# Patient Record
Sex: MALE | NOT HISPANIC OR LATINO | Employment: FULL TIME | URBAN - METROPOLITAN AREA
[De-identification: names, ages, dates, MRNs, and addresses within clinical notes are randomized per-mention and may not be internally consistent; named-entity substitution may affect disease eponyms.]

---

## 2024-12-24 ENCOUNTER — HOSPITAL ENCOUNTER (EMERGENCY)
Facility: HOSPITAL | Age: 54
Discharge: HOME | End: 2024-12-24
Attending: EMERGENCY MEDICINE
Payer: COMMERCIAL

## 2024-12-24 ENCOUNTER — APPOINTMENT (OUTPATIENT)
Dept: RADIOLOGY | Facility: HOSPITAL | Age: 54
End: 2024-12-24
Payer: COMMERCIAL

## 2024-12-24 VITALS
WEIGHT: 225 LBS | HEIGHT: 70 IN | OXYGEN SATURATION: 95 % | RESPIRATION RATE: 16 BRPM | SYSTOLIC BLOOD PRESSURE: 125 MMHG | TEMPERATURE: 97.3 F | BODY MASS INDEX: 32.21 KG/M2 | HEART RATE: 80 BPM | DIASTOLIC BLOOD PRESSURE: 50 MMHG

## 2024-12-24 DIAGNOSIS — R22.43 LOCALIZED SWELLING OF BOTH LOWER LEGS: Primary | ICD-10-CM

## 2024-12-24 DIAGNOSIS — M79.604 LEG PAIN, BILATERAL: ICD-10-CM

## 2024-12-24 DIAGNOSIS — Z96.653 HISTORY OF BILATERAL KNEE REPLACEMENT: ICD-10-CM

## 2024-12-24 DIAGNOSIS — M79.605 LEG PAIN, BILATERAL: ICD-10-CM

## 2024-12-24 LAB
ALBUMIN SERPL BCP-MCNC: 3.7 G/DL (ref 3.4–5)
ANION GAP SERPL CALC-SCNC: 12 MMOL/L (ref 10–20)
BASOPHILS # BLD AUTO: 0.05 X10*3/UL (ref 0–0.1)
BASOPHILS NFR BLD AUTO: 0.7 %
BUN SERPL-MCNC: 14 MG/DL (ref 6–23)
CALCIUM SERPL-MCNC: 9.2 MG/DL (ref 8.6–10.6)
CHLORIDE SERPL-SCNC: 97 MMOL/L (ref 98–107)
CO2 SERPL-SCNC: 29 MMOL/L (ref 21–32)
CREAT SERPL-MCNC: 0.98 MG/DL (ref 0.5–1.3)
CRP SERPL-MCNC: 8.21 MG/DL
EGFRCR SERPLBLD CKD-EPI 2021: >90 ML/MIN/1.73M*2
EOSINOPHIL # BLD AUTO: 0.07 X10*3/UL (ref 0–0.7)
EOSINOPHIL NFR BLD AUTO: 1 %
ERYTHROCYTE [DISTWIDTH] IN BLOOD BY AUTOMATED COUNT: 12.4 % (ref 11.5–14.5)
ERYTHROCYTE [SEDIMENTATION RATE] IN BLOOD BY WESTERGREN METHOD: 39 MM/H (ref 0–20)
GLUCOSE SERPL-MCNC: 132 MG/DL (ref 74–99)
HCT VFR BLD AUTO: 27.9 % (ref 41–52)
HGB BLD-MCNC: 9.9 G/DL (ref 13.5–17.5)
HOLD SPECIMEN: NORMAL
HOLD SPECIMEN: NORMAL
IMM GRANULOCYTES # BLD AUTO: 0.07 X10*3/UL (ref 0–0.7)
IMM GRANULOCYTES NFR BLD AUTO: 1 % (ref 0–0.9)
LYMPHOCYTES # BLD AUTO: 0.82 X10*3/UL (ref 1.2–4.8)
LYMPHOCYTES NFR BLD AUTO: 11.4 %
MAGNESIUM SERPL-MCNC: 1.88 MG/DL (ref 1.6–2.4)
MCH RBC QN AUTO: 29.9 PG (ref 26–34)
MCHC RBC AUTO-ENTMCNC: 35.5 G/DL (ref 32–36)
MCV RBC AUTO: 84 FL (ref 80–100)
MONOCYTES # BLD AUTO: 1.04 X10*3/UL (ref 0.1–1)
MONOCYTES NFR BLD AUTO: 14.4 %
NEUTROPHILS # BLD AUTO: 5.15 X10*3/UL (ref 1.2–7.7)
NEUTROPHILS NFR BLD AUTO: 71.5 %
NRBC BLD-RTO: 0 /100 WBCS (ref 0–0)
PHOSPHATE SERPL-MCNC: 3.1 MG/DL (ref 2.5–4.9)
PLATELET # BLD AUTO: 234 X10*3/UL (ref 150–450)
POTASSIUM SERPL-SCNC: 4.4 MMOL/L (ref 3.5–5.3)
RBC # BLD AUTO: 3.31 X10*6/UL (ref 4.5–5.9)
SODIUM SERPL-SCNC: 134 MMOL/L (ref 136–145)
WBC # BLD AUTO: 7.2 X10*3/UL (ref 4.4–11.3)

## 2024-12-24 PROCEDURE — 2500000001 HC RX 250 WO HCPCS SELF ADMINISTERED DRUGS (ALT 637 FOR MEDICARE OP): Performed by: EMERGENCY MEDICINE

## 2024-12-24 PROCEDURE — 86140 C-REACTIVE PROTEIN: CPT

## 2024-12-24 PROCEDURE — 2500000001 HC RX 250 WO HCPCS SELF ADMINISTERED DRUGS (ALT 637 FOR MEDICARE OP)

## 2024-12-24 PROCEDURE — 85652 RBC SED RATE AUTOMATED: CPT

## 2024-12-24 PROCEDURE — 99284 EMERGENCY DEPT VISIT MOD MDM: CPT | Mod: 25 | Performed by: EMERGENCY MEDICINE

## 2024-12-24 PROCEDURE — 73560 X-RAY EXAM OF KNEE 1 OR 2: CPT | Mod: BILATERAL PROCEDURE | Performed by: RADIOLOGY

## 2024-12-24 PROCEDURE — 73560 X-RAY EXAM OF KNEE 1 OR 2: CPT | Mod: 50

## 2024-12-24 PROCEDURE — 83735 ASSAY OF MAGNESIUM: CPT

## 2024-12-24 PROCEDURE — 80069 RENAL FUNCTION PANEL: CPT

## 2024-12-24 PROCEDURE — 93970 EXTREMITY STUDY: CPT

## 2024-12-24 PROCEDURE — 99285 EMERGENCY DEPT VISIT HI MDM: CPT | Performed by: EMERGENCY MEDICINE

## 2024-12-24 PROCEDURE — 36415 COLL VENOUS BLD VENIPUNCTURE: CPT

## 2024-12-24 PROCEDURE — 85025 COMPLETE CBC W/AUTO DIFF WBC: CPT

## 2024-12-24 PROCEDURE — 93971 EXTREMITY STUDY: CPT | Performed by: RADIOLOGY

## 2024-12-24 PROCEDURE — 99285 EMERGENCY DEPT VISIT HI MDM: CPT | Mod: 25

## 2024-12-24 RX ORDER — OXYCODONE AND ACETAMINOPHEN 5; 325 MG/1; MG/1
1 TABLET ORAL ONCE
Status: COMPLETED | OUTPATIENT
Start: 2024-12-24 | End: 2024-12-24

## 2024-12-24 RX ORDER — ACETAMINOPHEN 325 MG/1
650 TABLET ORAL ONCE
Status: COMPLETED | OUTPATIENT
Start: 2024-12-24 | End: 2024-12-24

## 2024-12-24 RX ORDER — OXYCODONE HYDROCHLORIDE 5 MG/1
5 TABLET ORAL ONCE
Status: COMPLETED | OUTPATIENT
Start: 2024-12-24 | End: 2024-12-24

## 2024-12-24 RX ORDER — OXYCODONE HYDROCHLORIDE 5 MG/1
5 TABLET ORAL EVERY 4 HOURS PRN
Qty: 12 TABLET | Refills: 0 | Status: SHIPPED | OUTPATIENT
Start: 2024-12-24 | End: 2025-01-13

## 2024-12-24 RX ORDER — POLYETHYLENE GLYCOL 3350 17 G/17G
17 POWDER, FOR SOLUTION ORAL DAILY
Qty: 20 PACKET | Refills: 0 | Status: SHIPPED | OUTPATIENT
Start: 2024-12-24 | End: 2025-01-13

## 2024-12-24 RX ORDER — NALOXONE HYDROCHLORIDE 1 MG/ML
2 INJECTION INTRAMUSCULAR; INTRAVENOUS; SUBCUTANEOUS AS NEEDED
Qty: 2 ML | Refills: 0 | Status: SHIPPED | OUTPATIENT
Start: 2024-12-24

## 2024-12-24 RX ADMIN — OXYCODONE HYDROCHLORIDE AND ACETAMINOPHEN 1 TABLET: 5; 325 TABLET ORAL at 16:04

## 2024-12-24 RX ADMIN — ACETAMINOPHEN 650 MG: 325 TABLET ORAL at 17:13

## 2024-12-24 RX ADMIN — OXYCODONE HYDROCHLORIDE 5 MG: 5 TABLET ORAL at 17:13

## 2024-12-24 ASSESSMENT — LIFESTYLE VARIABLES
HAVE YOU EVER FELT YOU SHOULD CUT DOWN ON YOUR DRINKING: NO
HAVE PEOPLE ANNOYED YOU BY CRITICIZING YOUR DRINKING: NO
TOTAL SCORE: 0
EVER FELT BAD OR GUILTY ABOUT YOUR DRINKING: NO
EVER HAD A DRINK FIRST THING IN THE MORNING TO STEADY YOUR NERVES TO GET RID OF A HANGOVER: NO

## 2024-12-24 ASSESSMENT — PAIN - FUNCTIONAL ASSESSMENT: PAIN_FUNCTIONAL_ASSESSMENT: 0-10

## 2024-12-24 ASSESSMENT — COLUMBIA-SUICIDE SEVERITY RATING SCALE - C-SSRS
6. HAVE YOU EVER DONE ANYTHING, STARTED TO DO ANYTHING, OR PREPARED TO DO ANYTHING TO END YOUR LIFE?: NO
1. IN THE PAST MONTH, HAVE YOU WISHED YOU WERE DEAD OR WISHED YOU COULD GO TO SLEEP AND NOT WAKE UP?: NO
2. HAVE YOU ACTUALLY HAD ANY THOUGHTS OF KILLING YOURSELF?: NO

## 2024-12-24 ASSESSMENT — PAIN DESCRIPTION - DESCRIPTORS: DESCRIPTORS: SHARP;PRESSURE

## 2024-12-24 ASSESSMENT — PAIN DESCRIPTION - LOCATION
LOCATION: KNEE
LOCATION: KNEE

## 2024-12-24 ASSESSMENT — PAIN SCALES - GENERAL
PAINLEVEL_OUTOF10: 10 - WORST POSSIBLE PAIN
PAINLEVEL_OUTOF10: 10 - WORST POSSIBLE PAIN

## 2024-12-24 ASSESSMENT — PAIN DESCRIPTION - ORIENTATION: ORIENTATION: LEFT

## 2024-12-24 ASSESSMENT — PAIN DESCRIPTION - PAIN TYPE: TYPE: ACUTE PAIN

## 2024-12-24 NOTE — PROGRESS NOTES
I received this patient in signout, he is presenting with bilateral lower leg pain after very recent bilateral knee replacement.  White count is reassuring, DVT ultrasound is negative for DVTs.  I reevaluated the patient, he does have some edema to the lower extremities, but otherwise appears well-perfused, his legs do not appear erythematous or hot to the touch, I have low suspicion for cellulitis or infection.  ESR and CRP are slightly elevated, likely from the fact that he recently had surgery, does not have a fever and white count is reassuring.  Did note that the ultrasound could not fully visualize one of the veins in the left leg, did recommend a repeat ultrasound in 1 to 2 weeks.  When I spoke to him, one of his main concerns was pain control at home, had gotten 5 mg of oxycodone which was helping but still not taking his pain to a tolerable level.  I increased his oxycodone dose for home as well as recommended Tylenol around-the-clock for pain control.  Given his surgery was at an outside facility, and workup in the ER thus far is negative with reassuring physical exam, I do not feel like an orthopedic consult is indicated.  Discharged in stable condition with good return precautions.

## 2024-12-24 NOTE — ED TRIAGE NOTES
Pt states that he had a bilateral knee replacement Thursday and pt states that left knee is red, swollen and increased pain,

## 2024-12-24 NOTE — ED PROVIDER NOTES
Emergency Department Provider Note        History of Present Illness     CC: Wound Check and Constipation     HPI:  This is a 54-year-old male with recent bilateral knee replacements who presents to the emergency department with left leg pain.  Patient's wife is at bedside and provides additional history.  He states that he had his knees replaced last Thursday.  States that he has been taking a lot of pain medicine at home however his left leg is getting worse with pain and swelling.  States that his pain originates in his calf and travels up the entire length of his leg.  Also reports swelling that seems to be getting worse instead of better, and is worse on the left side than the right.  His wife thought she noticed some redness on the inside of the left thigh.  He states that his leg feels heavy and he is unable to keep it lifted.  He is unable to move it significantly without pain.  He denies any fevers however does endorse some chills.  Has intermittent nausea but denies vomiting.     Limitations to history: None  Independent historian(s): Patient's wife at bedside   Records Reviewed: Recent available ED and inpatient notes reviewed in EMR.    PMHx/PSHx:  Per HPI.   - has a past medical history of Calculus of ureter (11/21/2014) and Personal history of urinary calculi (11/21/2014).  - has a past surgical history that includes CT angio neck (4/5/2023) and CT angio head w and wo IV contrast (4/5/2023).    Medications:  Reviewed in EMR. See EMR for complete list of medications and doses.    Allergies:  Patient has no known allergies.    Social History:  - Tobacco:  has no history on file for tobacco use.   - Alcohol:  has no history on file for alcohol use.   - Illicit Drugs:  has no history on file for drug use.     ROS:  Per HPI.       Physical Exam     Triage Vitals:  T 36.3 °C (97.3 °F)  HR (!) 102  /63  RR 18  O2 100 %      General: Patient resting comfortably in bed, no acute distress, breathing  easily, overall well appearing, and appropriately conversational without confusion or gross mental status changes.  Head: Normocephalic. Atraumatic.  Neck:  FROM. No gross masses.   Eyes: EOMI. No scleral icterus or injection.  ENT: Moist mucous membranes, no apparent trauma or lesions.  CV: Regular rhythm. No murmurs, rubs, gallops appreciated. 2+ radial and DP pulses bilaterally.  Resp: Clear to auscultation bilaterally. No respiratory distress.   GI: Soft, non-distended.  No tenderness with palpation.   EXT: Bilateral lower extremity swelling, mildly worse on the left with scars on bilateral anterior knees. Both appear well-approximated, sutures intact, and without redness, warmth, or purulent drainage. There is no significant redness appreciated throughout the bilateral lower extremities. There is some tenderness with palpation throughout the bilateral knees, worse on the left. All compartments of the bilateral lower extremities are soft.   Skin: Warm and dry, no rashes or lesions.  Neuro: Alert and oriented.  No focal neurological deficits.  Equal motor strength in bilateral upper and lower extremities.  Sensation intact throughout.  Speech fluent.  Psych: Appropriate mood and behavior, converses and responds appropriately.      Medical Decision Making & ED Course     Labs:   Labs Reviewed   CBC WITH AUTO DIFFERENTIAL - Abnormal       Result Value    WBC 7.2      nRBC 0.0      RBC 3.31 (*)     Hemoglobin 9.9 (*)     Hematocrit 27.9 (*)     MCV 84      MCH 29.9      MCHC 35.5      RDW 12.4      Platelets 234      Neutrophils % 71.5      Immature Granulocytes %, Automated 1.0 (*)     Lymphocytes % 11.4      Monocytes % 14.4      Eosinophils % 1.0      Basophils % 0.7      Neutrophils Absolute 5.15      Immature Granulocytes Absolute, Automated 0.07      Lymphocytes Absolute 0.82 (*)     Monocytes Absolute 1.04 (*)     Eosinophils Absolute 0.07      Basophils Absolute 0.05     RENAL FUNCTION PANEL - Abnormal     Glucose 132 (*)     Sodium 134 (*)     Potassium 4.4      Chloride 97 (*)     Bicarbonate 29      Anion Gap 12      Urea Nitrogen 14      Creatinine 0.98      eGFR >90      Calcium 9.2      Phosphorus 3.1      Albumin 3.7     SEDIMENTATION RATE, AUTOMATED - Abnormal    Sedimentation Rate 39 (*)    C-REACTIVE PROTEIN - Abnormal    C-Reactive Protein 8.21 (*)    MAGNESIUM - Normal    Magnesium 1.88          Imaging:   Vascular US lower extremity venous duplex bilateral   Final Result   No sonographic evidence for deep vein thrombosis within the evaluated   veins of the bilateral lower extremities.        I personally reviewed the images/study and I agree with the findings   as stated by Javy Borrero DO, PGY-3. This study was interpreted   at [facility], [place], [place].        MACRO:   None        Signed by: Elan Cooper 12/24/2024 3:14 PM   Dictation workstation:   [id number]      XR knee 1-2 views bilateral   Final Result   Status post left knee arthroplasty with a satisfactory alignment.        Signed by: Jose De Jesus Wright 12/24/2024 1:03 PM   Dictation workstation:   [id number]           EKG:  None    MDM:  This is a 54 male who presents to the emergency department for evaluation of bilateral lower extremities.  Patient is status post bilateral knee replacements less than 1 week ago at a private outside orthopedic hospital.  He is not set to follow-up with his surgeon until 2 weeks postop.  He presents due to swelling and has bilateral lower extremities that he feels is worse on the left.  He had called the surgeon's office and was recommended to present to an ED for evaluation.  Upon arrival here he is hemodynamically stable and in no distress.  His vital signs are within normal limits.  He has swelling in the legs, some mild tenderness on palpation.  There are no signs of infection including warmth, erythema, or purulent drainage.  The surgical sites appears well.  Do  not suspect a skin or soft tissue infection in those areas.  Compartment are soft, have low suspicion for compartment syndrome.  Considered DVTs, bilateral duplex ultrasounds obtained and negative for any blood clots.  Xrays show evidence of knee arthroplasty with no other acute abnormalities.  Lab work obtained, showing no leukocytosis, hemoglobin in 9.9.  ESR and CRP are mildly elevated, however most likely secondary to recent surgery.  Suspect patient's symptoms are most likely expected post-operative changes.  Had multiple long discussions with the patient regarding his workup and the likely diagnosis.  Though I have very low suspicion, informed him we can not definitively rule out a septic arthritis, however would require an arthrocentesis and discussed would not be appropriate to obtain in the post-operative setting.  Informed him his surgeon would need to evaluate and perform if they felt it necessary.  We have ruled out other emergent sources of symptoms including wound infection, DVT and compartment syndrome.  Recommended the patient contact his surgeon for office evaluation in the next few days.  Recommended continued symptom management with home prescriptions and provided additional opioid medications.  Also recommended a bowel regimen and this is also prescribed.  Patient expressed understanding and agreed with the plan.  All questions answered and return precautions provided.  He expressed understanding and agreed with the plan.  He remained hemodynamically stable and is discharged home.       ED Course:  Diagnoses as of 12/28/24 1118   Localized swelling of both lower legs   History of bilateral knee replacement   Leg pain, bilateral       Independent Result Review and Interpretation: Relevant laboratory and radiographic results were reviewed and independently interpreted by myself.  As necessary, they are commented on in the ED Course.    Social Determinants Limiting Care:  None  identified      Patient seen by and discussed with the attending emergency medicine physician.       Disposition    Discharge    Power Babcock, DO   Emergency Medicine PGY-3  [facility]      Procedures      Procedures ? SmartLinks last updated 12/28/2024 11:18 AM        Power Babcock DO  Resident  12/28/24 1118       Power Babcock,   Resident  12/29/24 1310

## 2024-12-24 NOTE — DISCHARGE INSTRUCTIONS
You had x-rays that did not show any complication with the surgery, and ultrasounds that did not show any blood clots.  Based on your physical exam and blood work, we have low suspicion for an infection in the leg at this time.  I am prescribing you oxycodone for pain and MiraLAX to help with your bowel movements.  It is extremely important that you follow-up with your doctors as soon as you are able to.    Please take tylenol 1000mg every 8 hours for your pain, and use the oxycodone additionally as needed.     Come back to the emergency room if you develop a fever over 100.3 degrees, your swelling becomes significantly worse, you start to develop a wound that is weeping pus, the skin becomes significantly more painful and hot, or you have any new or concerning symptoms.

## 2025-01-23 ENCOUNTER — LAB (OUTPATIENT)
Dept: LAB | Facility: LAB | Age: 55
End: 2025-01-23
Payer: COMMERCIAL

## 2025-01-23 ENCOUNTER — APPOINTMENT (OUTPATIENT)
Dept: PRIMARY CARE | Facility: CLINIC | Age: 55
End: 2025-01-23

## 2025-01-23 VITALS
HEIGHT: 70 IN | SYSTOLIC BLOOD PRESSURE: 137 MMHG | WEIGHT: 216 LBS | BODY MASS INDEX: 30.92 KG/M2 | DIASTOLIC BLOOD PRESSURE: 73 MMHG | HEART RATE: 90 BPM

## 2025-01-23 DIAGNOSIS — Z00.00 ROUTINE GENERAL MEDICAL EXAMINATION AT A HEALTH CARE FACILITY: ICD-10-CM

## 2025-01-23 DIAGNOSIS — D50.9 IRON DEFICIENCY ANEMIA, UNSPECIFIED IRON DEFICIENCY ANEMIA TYPE: ICD-10-CM

## 2025-01-23 DIAGNOSIS — E55.9 VITAMIN D DEFICIENCY: ICD-10-CM

## 2025-01-23 DIAGNOSIS — Z00.00 ROUTINE GENERAL MEDICAL EXAMINATION AT A HEALTH CARE FACILITY: Primary | ICD-10-CM

## 2025-01-23 LAB
25(OH)D3 SERPL-MCNC: 19 NG/ML (ref 30–100)
ALBUMIN SERPL BCP-MCNC: 4.2 G/DL (ref 3.4–5)
ALP SERPL-CCNC: 75 U/L (ref 33–120)
ALT SERPL W P-5'-P-CCNC: 11 U/L (ref 10–52)
ANION GAP SERPL CALC-SCNC: 15 MMOL/L (ref 10–20)
AST SERPL W P-5'-P-CCNC: 14 U/L (ref 9–39)
BILIRUB SERPL-MCNC: 0.6 MG/DL (ref 0–1.2)
BUN SERPL-MCNC: 9 MG/DL (ref 6–23)
CALCIUM SERPL-MCNC: 10 MG/DL (ref 8.6–10.6)
CHLORIDE SERPL-SCNC: 103 MMOL/L (ref 98–107)
CHOLEST SERPL-MCNC: 211 MG/DL (ref 0–199)
CHOLESTEROL/HDL RATIO: 4.3
CO2 SERPL-SCNC: 27 MMOL/L (ref 21–32)
CREAT SERPL-MCNC: 0.97 MG/DL (ref 0.5–1.3)
EGFRCR SERPLBLD CKD-EPI 2021: >90 ML/MIN/1.73M*2
ERYTHROCYTE [DISTWIDTH] IN BLOOD BY AUTOMATED COUNT: 13.2 % (ref 11.5–14.5)
EST. AVERAGE GLUCOSE BLD GHB EST-MCNC: 91 MG/DL
FERRITIN SERPL-MCNC: 234 NG/ML (ref 20–300)
GLUCOSE SERPL-MCNC: 104 MG/DL (ref 74–99)
HBA1C MFR BLD: 4.8 %
HCT VFR BLD AUTO: 36.4 % (ref 41–52)
HDLC SERPL-MCNC: 49.2 MG/DL
HGB BLD-MCNC: 11.3 G/DL (ref 13.5–17.5)
IRON SATN MFR SERPL: 20 % (ref 25–45)
IRON SERPL-MCNC: 56 UG/DL (ref 35–150)
LDLC SERPL CALC-MCNC: 149 MG/DL
MCH RBC QN AUTO: 28.9 PG (ref 26–34)
MCHC RBC AUTO-ENTMCNC: 31 G/DL (ref 32–36)
MCV RBC AUTO: 93 FL (ref 80–100)
NON HDL CHOLESTEROL: 162 MG/DL (ref 0–149)
NRBC BLD-RTO: 0 /100 WBCS (ref 0–0)
PLATELET # BLD AUTO: 341 X10*3/UL (ref 150–450)
POTASSIUM SERPL-SCNC: 4.5 MMOL/L (ref 3.5–5.3)
PROT SERPL-MCNC: 7.7 G/DL (ref 6.4–8.2)
RBC # BLD AUTO: 3.91 X10*6/UL (ref 4.5–5.9)
SODIUM SERPL-SCNC: 140 MMOL/L (ref 136–145)
TIBC SERPL-MCNC: 274 UG/DL (ref 240–445)
TRIGL SERPL-MCNC: 65 MG/DL (ref 0–149)
UIBC SERPL-MCNC: 218 UG/DL (ref 110–370)
VLDL: 13 MG/DL (ref 0–40)
WBC # BLD AUTO: 8.2 X10*3/UL (ref 4.4–11.3)

## 2025-01-23 PROCEDURE — 83550 IRON BINDING TEST: CPT

## 2025-01-23 PROCEDURE — 82728 ASSAY OF FERRITIN: CPT

## 2025-01-23 PROCEDURE — 85027 COMPLETE CBC AUTOMATED: CPT

## 2025-01-23 PROCEDURE — 99203 OFFICE O/P NEW LOW 30 MIN: CPT | Performed by: INTERNAL MEDICINE

## 2025-01-23 PROCEDURE — 80061 LIPID PANEL: CPT

## 2025-01-23 PROCEDURE — 3008F BODY MASS INDEX DOCD: CPT | Performed by: INTERNAL MEDICINE

## 2025-01-23 PROCEDURE — 83036 HEMOGLOBIN GLYCOSYLATED A1C: CPT

## 2025-01-23 PROCEDURE — 80053 COMPREHEN METABOLIC PANEL: CPT

## 2025-01-23 PROCEDURE — 83540 ASSAY OF IRON: CPT

## 2025-01-23 PROCEDURE — 82306 VITAMIN D 25 HYDROXY: CPT

## 2025-01-23 RX ORDER — SENNOSIDES 8.6 MG/1
TABLET ORAL
COMMUNITY
Start: 2024-12-23

## 2025-01-23 RX ORDER — OXYCODONE HYDROCHLORIDE 5 MG/1
CAPSULE ORAL
COMMUNITY

## 2025-01-23 RX ORDER — PREDNISOLONE 10 MG/1
10 TABLET, ORALLY DISINTEGRATING ORAL DAILY
COMMUNITY

## 2025-01-23 RX ORDER — DORZOLAMIDE HYDROCHLORIDE AND TIMOLOL MALEATE 20; 5 MG/ML; MG/ML
1 SOLUTION/ DROPS OPHTHALMIC 2 TIMES DAILY
COMMUNITY

## 2025-01-23 RX ORDER — LATANOPROST 50 UG/ML
1 SOLUTION/ DROPS OPHTHALMIC NIGHTLY
COMMUNITY

## 2025-01-23 ASSESSMENT — ENCOUNTER SYMPTOMS
SLEEP DISTURBANCE: 1
PALPITATIONS: 0
JOINT SWELLING: 1

## 2025-01-23 NOTE — PATIENT INSTRUCTIONS
As discussed today, our plan is:     Labs - you can get this done today or come back anytime in the next 4-6 weeks at any  facility. Our office will contact you if any of your results are abnormal. You can view all of your results on [employer].       Please come back to see me in: 6 months  ------  If you have any problems or questions, please contact the clinic at [phone] to leave a message. Our fax number is [phone]. If your issue cannot wait until the next business day, please go to urgent care or the emergency department.     No shows: It is understandable if you are unable to make it to a visit, but please cancel your appointment instead of not showing up. This helps to give other patients access to primary care and keeps wait times low.      Dr. Yasir Tobin

## 2025-01-23 NOTE — PROGRESS NOTES
INTERNAL MEDICINE - PRIMARY CARE  New patient visit     Jose Cruz Guerrier is 54 y.o. a male  who presents to [facility] care.      Problem list   Glaucoma  Hx of bilateral knee replacement  Pescatarian    Subjective    HPI   Bilateral Knee Replacement Recovery  The patient underwent bilateral total knee replacement on December 19th at [facility]. While the right knee has progressed well, the other has been problematic with persistent pain, swelling, and limited range of motion. The patient visited the emergency room last month due to pain and swelling in the left leg, where an ultrasound was performed - negative for DVT. He recently saw his orthopedic surgeon, Dr. Perry, who prescribed a one-week course of prednisone for inflammation and more oxycodone for pain management. The patient reports attending physical therapy, where his range of motion was measured at 106 degrees at 4 weeks post-op. He has been off work since the surgery and aims to return by March 1st if possible.    Recent Surgical History  The patient reports a history of multiple recent surgeries, including a torn rotator cuff repair, meniscus surgery, and now the bilateral knee replacement. He describes himself as typically healthy but notes these surgical interventions over the past 4-5 years.     Glaucoma  Follows very closely with optho. Reports worse vision in right eye than left.     Sleep  Sleeping better now that he is off work, but works 2 jobs so he does not sleep much.      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  "Negative for chest pain and palpitations.   Musculoskeletal:  Positive for gait problem and joint swelling.   Psychiatric/Behavioral:  Positive for sleep disturbance.           Exam    Physical Exam     Visit Vitals  /73   Pulse 90   Ht 1.778 m (5' 10\")   Wt 98 kg (216 lb)   BMI 30.99 kg/m²   Smoking Status Never   BSA 2.2 m²        Physical Exam  Constitutional:       Appearance: Normal appearance.   Cardiovascular:      Rate and Rhythm: Normal rate and regular rhythm.      Pulses: Normal pulses.   Pulmonary:      Effort: Pulmonary effort is normal. No respiratory distress.      Breath sounds: Wheezing present.   Musculoskeletal:      Right knee: No erythema. Decreased range of motion.      Left knee: Swelling present. No erythema. Decreased range of motion.      Comments: Well healing surgical scars to bilateral knees.   Neurological:      Mental Status: He is alert.          Objective    Medications     Current Outpatient Medications   Medication Instructions    dorzolamide-timoloL (Cosopt) 22.3-6.8 mg/mL ophthalmic solution 1 drop, 2 times daily    latanoprost (Xalatan) 0.005 % ophthalmic solution 1 drop, Nightly    naloxone (NARCAN) 2 mg, nasal, As needed, May repeat every 2-3 minutes as needed until medical assistance available.    oxyCODONE (Oxy-IR) 5 mg immediate release capsule Take by mouth.    prednisoLONE ODT (ORAPRED ODT) 10 mg, oral, Daily, Take 4mg daily.    sennosides (Senna Lax) 8.6 mg tablet 1 tablet 2 TIMES DAILY (route: oral)          Assessment/Plan    Assessment/Plan    Continue to follow-up with orthopedics and physical therapy post-op.   Routine labs today - including CBC and iron studies to follow-up post-op anemia.   Advised to increase water intake.  Mild wheezing in right lung - monitor for now, Advised to restart incentive spirometry at home and notify me if there's worsening of symptoms.    RTC 6 months    Problem List Items Addressed This Visit    None  Visit Diagnoses       " Routine general medical examination at a health care facility    -  Primary    Relevant Orders    Comprehensive Metabolic Panel    Lipid Panel    Hemoglobin A1C    Iron deficiency anemia, unspecified iron deficiency anemia type        Relevant Orders    CBC    Iron and TIBC    Ferritin    Vitamin D deficiency        Relevant Orders    Vitamin D 25-Hydroxy,Total (for eval of Vitamin D levels)                     Yasir Tobin MD MPH  I am the attending physician.

## 2025-07-24 ENCOUNTER — APPOINTMENT (OUTPATIENT)
Dept: PRIMARY CARE | Facility: CLINIC | Age: 55
End: 2025-07-24
Payer: COMMERCIAL

## 2025-07-24 VITALS
DIASTOLIC BLOOD PRESSURE: 73 MMHG | BODY MASS INDEX: 30.35 KG/M2 | WEIGHT: 212 LBS | HEART RATE: 71 BPM | HEIGHT: 70 IN | SYSTOLIC BLOOD PRESSURE: 118 MMHG

## 2025-07-24 DIAGNOSIS — M25.562 CHRONIC PAIN OF BOTH KNEES: ICD-10-CM

## 2025-07-24 DIAGNOSIS — Z00.00 ROUTINE GENERAL MEDICAL EXAMINATION AT A HEALTH CARE FACILITY: Primary | ICD-10-CM

## 2025-07-24 DIAGNOSIS — D17.9 LIPOMA, UNSPECIFIED SITE: ICD-10-CM

## 2025-07-24 DIAGNOSIS — G89.29 CHRONIC PAIN OF BOTH KNEES: ICD-10-CM

## 2025-07-24 DIAGNOSIS — M25.561 CHRONIC PAIN OF BOTH KNEES: ICD-10-CM

## 2025-07-24 PROCEDURE — 99396 PREV VISIT EST AGE 40-64: CPT | Performed by: INTERNAL MEDICINE

## 2025-07-24 PROCEDURE — 1036F TOBACCO NON-USER: CPT | Performed by: INTERNAL MEDICINE

## 2025-07-24 PROCEDURE — 3008F BODY MASS INDEX DOCD: CPT | Performed by: INTERNAL MEDICINE

## 2025-07-24 PROCEDURE — 99213 OFFICE O/P EST LOW 20 MIN: CPT | Performed by: INTERNAL MEDICINE

## 2025-07-24 RX ORDER — LIDOCAINE 560 MG/1
1 PATCH PERCUTANEOUS; TOPICAL; TRANSDERMAL DAILY
Qty: 30 PATCH | Refills: 11 | Status: SHIPPED | OUTPATIENT
Start: 2025-07-24 | End: 2026-07-24

## 2025-07-24 ASSESSMENT — PATIENT HEALTH QUESTIONNAIRE - PHQ9
SUM OF ALL RESPONSES TO PHQ9 QUESTIONS 1 & 2: 0
1. LITTLE INTEREST OR PLEASURE IN DOING THINGS: NOT AT ALL
2. FEELING DOWN, DEPRESSED OR HOPELESS: NOT AT ALL

## 2025-07-24 ASSESSMENT — PAIN SCALES - GENERAL: PAINLEVEL_OUTOF10: 0-NO PAIN

## 2025-07-24 NOTE — PATIENT INSTRUCTIONS
As discussed today, our plan is:     Ultrasound  General surgery   Please consider getting your Shingles vaccine    Please come back to see me in: 1 year  ------  If you have any problems or questions, please contact the clinic at [phone] to leave a message. Our fax number is [phone]. If your issue cannot wait until the next business day, please go to urgent care or the emergency department.     No shows: It is understandable if you are unable to make it to a visit, but please cancel your appointment instead of not showing up. This helps to give other patients access to primary care and keeps wait times low.      Dr. Yasir Tobin

## 2025-07-24 NOTE — PROGRESS NOTES
"  INTERNAL MEDICINE - PRIMARY CARE  Established patient visit       Jose Cruz Guerrier is 54 y.o. a male   who presents for Follow-up         Problem list   Glaucoma  Hx of bilateral knee replacement  Pescatarian  Vitamin D deficiency     Subjective    HPI   The patient presents with the following concerns:    Shoulder Mass:  Patient reports a mass on the right shoulder. The mass is not painful and has been present for >1 year. He reports it is getting larger. Patient expresses concern about its nature and is interested in having it removed.    Knee Discomfort:  Patient reports ongoing issues with their left knee, experiencing stiffness rather than pain. They describe it as an \"achy pain\" that has persisted for one year. The discomfort affects mobility, particularly when walking for extended periods, such as in malls. Patient completed physical therapy but continues to experience symptoms. They inquire about obtaining a handicapped parking placard due to their knee issues.    Fatigue:  Patient mentions feeling tired, which they attribute to being \"under pressure a lot.\"    Eye Issues:  Patient reports a history of eye issues, specifically glaucoma. Condition is stable with ongoing eye drop treatment since their last ophthalmology visit in April.    Paperwork Request:  Patient requests assistance in completing paperwork for their annual biometrics screening required by their job.     Review of Systems:  General: Positive for fatigue.  HEENT: Negative for vision changes.  Musculoskeletal: Positive for left knee stiffness and aching.    Exam    Physical Exam     Visit Vitals  /73   Pulse 71   Ht 1.778 m (5' 10\")   Wt 96.2 kg (212 lb)   BMI 30.42 kg/m²   Smoking Status Never   BSA 2.18 m²        Physical Exam  Vitals reviewed.   Constitutional:       Appearance: Normal appearance. He is normal weight.     Cardiovascular:      Rate and Rhythm: Normal rate.   Pulmonary:      Effort: Pulmonary effort is normal.      " "Breath sounds: Normal breath sounds.     Skin:          Comments: Large, firm, immobile mass present to right shoulder.      Neurological:      Mental Status: He is alert.          Objective    Medications     Current Outpatient Medications   Medication Instructions    dorzolamide-timoloL (Cosopt) 22.3-6.8 mg/mL ophthalmic solution 1 drop, 2 times daily    latanoprost (Xalatan) 0.005 % ophthalmic solution 1 drop, Nightly    lidocaine (Salonpas, lidocaine,) 4 % patch 1 patch, transdermal, Daily, Remove & discard patch within 12 hours or as directed by MD.          Assessment/Plan    Assessment/Plan    Shoulder Mass (Likely Lipoma):     - Mass on shoulder consistent with lipoma noted. Not painful, present for unspecified period.     - Plan: Order ultrasound of shoulder mass. Refer to surgeon for evaluation and potential removal. Educate patient on benign nature of lipomas, surgical removal process, and possibility of future lipoma development.    Left Knee Stiffness:     - Ongoing stiffness reported after completing physical therapy. Described as \"achy pain\", chronic condition lasting about a year, affecting mobility during extended walking.     - Plan: Prescribe handicapped parking placard x 1 year.      RTC 1 year or prn     Problem List Items Addressed This Visit       Chronic pain of both knees    Relevant Medications    lidocaine (Salonpas, lidocaine,) 4 % patch    Other Relevant Orders    Disability Placard    Lipoma - Primary    Relevant Orders    US head neck soft tissue    Referral to General Surgery                  Yasir Tobin MD MPH  I am the attending physician.     "

## 2025-07-30 ENCOUNTER — APPOINTMENT (OUTPATIENT)
Dept: RADIOLOGY | Facility: HOSPITAL | Age: 55
End: 2025-07-30
Payer: COMMERCIAL

## 2026-07-28 ENCOUNTER — APPOINTMENT (OUTPATIENT)
Dept: PRIMARY CARE | Facility: CLINIC | Age: 56
End: 2026-07-28
Payer: COMMERCIAL